# Patient Record
Sex: FEMALE | Race: BLACK OR AFRICAN AMERICAN | NOT HISPANIC OR LATINO | Employment: FULL TIME | URBAN - METROPOLITAN AREA
[De-identification: names, ages, dates, MRNs, and addresses within clinical notes are randomized per-mention and may not be internally consistent; named-entity substitution may affect disease eponyms.]

---

## 2018-08-07 ENCOUNTER — HISTORICAL (OUTPATIENT)
Dept: RADIOLOGY | Facility: HOSPITAL | Age: 53
End: 2018-08-07

## 2018-08-15 ENCOUNTER — HISTORICAL (OUTPATIENT)
Dept: RADIOLOGY | Facility: HOSPITAL | Age: 53
End: 2018-08-15

## 2018-08-21 ENCOUNTER — HISTORICAL (OUTPATIENT)
Dept: RADIOLOGY | Facility: HOSPITAL | Age: 53
End: 2018-08-21

## 2018-09-04 ENCOUNTER — HISTORICAL (OUTPATIENT)
Dept: RADIOLOGY | Facility: HOSPITAL | Age: 53
End: 2018-09-04

## 2018-09-06 ENCOUNTER — HISTORICAL (OUTPATIENT)
Dept: SURGERY | Facility: HOSPITAL | Age: 53
End: 2018-09-06

## 2018-09-06 LAB
ABS NEUT (OLG): 3.2 X10(3)/MCL (ref 1.5–6.9)
ALBUMIN SERPL-MCNC: 3.6 GM/DL (ref 3.4–5)
ALBUMIN/GLOB SERPL: 0.8 RATIO
ALP SERPL-CCNC: 92 UNIT/L (ref 30–113)
ALT SERPL-CCNC: 29 UNIT/L (ref 10–45)
APTT PPP: 28 SECOND(S) (ref 25–35)
AST SERPL-CCNC: 15 UNIT/L (ref 15–37)
BASOPHILS # BLD AUTO: 0 X10(3)/MCL (ref 0–0.1)
BASOPHILS NFR BLD AUTO: 0 % (ref 0–1)
BILIRUB SERPL-MCNC: 0.3 MG/DL (ref 0.1–0.9)
BILIRUBIN DIRECT+TOT PNL SERPL-MCNC: 0.1 MG/DL (ref 0–0.3)
BILIRUBIN DIRECT+TOT PNL SERPL-MCNC: 0.2 MG/DL
BUN SERPL-MCNC: 21 MG/DL (ref 10–20)
CALCIUM SERPL-MCNC: 9 MG/DL (ref 8–10.5)
CHLORIDE SERPL-SCNC: 106 MMOL/L (ref 100–108)
CO2 SERPL-SCNC: 26 MMOL/L (ref 21–35)
CREAT SERPL-MCNC: 0.8 MG/DL (ref 0.7–1.3)
EOSINOPHIL # BLD AUTO: 0.2 X10(3)/MCL (ref 0–0.6)
EOSINOPHIL NFR BLD AUTO: 4 % (ref 0–5)
ERYTHROCYTE [DISTWIDTH] IN BLOOD BY AUTOMATED COUNT: 13.2 % (ref 11.5–17)
GLOBULIN SER-MCNC: 4.6 GM/DL
GLUCOSE SERPL-MCNC: 90 MG/DL (ref 75–116)
HCT VFR BLD AUTO: 39.1 % (ref 36–48)
HGB BLD-MCNC: 12.7 GM/DL (ref 12–16)
INR PPP: 0.9 (ref 0–1.2)
LYMPHOCYTES # BLD AUTO: 1.7 X10(3)/MCL (ref 0.5–4.1)
LYMPHOCYTES NFR BLD AUTO: 28.8 % (ref 15–40)
MCH RBC QN AUTO: 25 PG (ref 27–34)
MCHC RBC AUTO-ENTMCNC: 32 GM/DL (ref 31–36)
MCV RBC AUTO: 78 FL (ref 80–99)
MONOCYTES # BLD AUTO: 0.6 X10(3)/MCL (ref 0–1.1)
MONOCYTES NFR BLD AUTO: 11 % (ref 4–12)
NEUTROPHILS # BLD AUTO: 3.2 X10(3)/MCL (ref 1.5–6.9)
NEUTROPHILS NFR BLD AUTO: 56 % (ref 43–75)
PLATELET # BLD AUTO: 337 X10(3)/MCL (ref 140–400)
PMV BLD AUTO: 8.7 FL (ref 6.8–10)
POTASSIUM SERPL-SCNC: 4 MMOL/L (ref 3.6–5.2)
PROT SERPL-MCNC: 8.2 GM/DL (ref 6.4–8.2)
PROTHROMBIN TIME: 9.8 SECOND(S) (ref 9–12)
RBC # BLD AUTO: 5.02 X10(6)/MCL (ref 4.2–5.4)
SODIUM SERPL-SCNC: 140 MMOL/L (ref 135–145)
WBC # SPEC AUTO: 5.8 X10(3)/MCL (ref 4.5–11.5)

## 2018-09-10 ENCOUNTER — HOSPITAL ENCOUNTER (OUTPATIENT)
Dept: MEDSURG UNIT | Facility: HOSPITAL | Age: 53
End: 2018-09-11
Attending: SURGERY | Admitting: SURGERY

## 2020-05-14 ENCOUNTER — HISTORICAL (OUTPATIENT)
Dept: ADMINISTRATIVE | Facility: HOSPITAL | Age: 55
End: 2020-05-14

## 2020-05-15 ENCOUNTER — HISTORICAL (OUTPATIENT)
Dept: ADMINISTRATIVE | Facility: HOSPITAL | Age: 55
End: 2020-05-15

## 2020-08-10 ENCOUNTER — HISTORICAL (OUTPATIENT)
Dept: RADIOLOGY | Facility: HOSPITAL | Age: 55
End: 2020-08-10

## 2020-11-05 ENCOUNTER — HISTORICAL (OUTPATIENT)
Dept: ADMINISTRATIVE | Facility: HOSPITAL | Age: 55
End: 2020-11-05

## 2021-10-15 ENCOUNTER — HISTORICAL (OUTPATIENT)
Dept: SURGERY | Facility: HOSPITAL | Age: 56
End: 2021-10-15

## 2021-10-15 LAB — SARS-COV-2 AG RESP QL IA.RAPID: NOT DETECTED

## 2021-10-18 ENCOUNTER — HISTORICAL (OUTPATIENT)
Dept: ANESTHESIOLOGY | Facility: HOSPITAL | Age: 56
End: 2021-10-18

## 2021-11-23 ENCOUNTER — HISTORICAL (OUTPATIENT)
Dept: ADMINISTRATIVE | Facility: HOSPITAL | Age: 56
End: 2021-11-23

## 2021-12-03 ENCOUNTER — HISTORICAL (OUTPATIENT)
Dept: ADMINISTRATIVE | Facility: HOSPITAL | Age: 56
End: 2021-12-03

## 2022-04-30 NOTE — OP NOTE
ADMITTING DIAGNOSIS:  Ventral incisional supraumbilical hernia, incarcerated, with no obstruction, initial onset.    PROCEDURES:    1. Robotic assisted laparoscopic repair of ventral hernia with primary closure using 0 Prolene, and then coverage with 9 cm Symbotex mesh.    2. Repair of rectus diastasis with 0 Prolene.    INDICATIONS:  The patient is a 53-year-old  female with anxiety, overactive bladder, had a  x3, tubal ligation, and colonoscopy 2 years ago by Dr. Sutton, had some polyps at that time.  She works at the [employer] office and came to me with a supraumbilical hernia, and associated pain and tenderness present over the last 3 months.  She was referred to me by Dr. Carrera.  She had an ultrasound of the abdomen on 2018, that was normal with fatty liver.  Pelvis also was normal.  CT scan of the abdomen and pelvis showed a supraumbilical ventral abdominal wall hernia with preperitoneal fat, not incarcerated, not reducible.    DESCRIPTION OF PROCEDURE:  The patient was brought to the operating room, underwent Visiport insertion in the left upper quadrant subcostally, and then had a second 8 mm trocar placed just adjacent to the anterior-superior iliac spine.  In between these 2 trocars, a third, 8 mm trocar was placed 5 cm posteriorly between the subcostal margin and the anterior-superior iliac spine.  The patient underwent lysis of adhesions and reduction of the hernia defect.  The preperitoneal fat was reduced, and the hernia defect measured approximately 1 to 2 cm in size.  There was a 2nd hernia defect just immediately above the umbilicus that was 1 to 2 cm in size as well, and reduced, that was an incisional hernia.  The patient had a rectus diastasis.  I felt the rectus diastasis needed to be reapproximated and incorporated into the hernia repair.  A 0 Prolene was used to repair as much of the rectus diastasis as I could.  Given the limitations of the robot  that I was working with, I could not rotate all the way to the top of the xiphoid, but was able to get a substantial amount of her rectus diastasis closed.  The patient had 0 Prolene used to do this, and then I took a piece of 9 cm Symbotex mesh to cover the area where the hernias were.  This mesh was sutured in with 0 Prolene as well circumferentially.  The patient also had it tacked to the primary closure in the midline with a 0 Prolene.  The patient tolerated the procedure well, had no difficulties, had the trocars removed.  The aponeuroses of the 8 and 10 mm trocar sites were closed with 0 Vicryl on a  needle.  Subcu was closed with 4-0 plain gut suture.  Dermabond was used for the skin.  Sterile dressings were applied.  No problems were encountered.  The patient tolerated the procedure well.  It should be noted, that the patient will undergo monitoring overnight and pain management before discharge in the morning.     I appreciate the consultation referral from Dr. Carrera, as well as  __________.        WALE/MILAGROS   DD: 09/10/2018 1823   DT: 09/10/2018 1918  Job # [id number]    cc: ____________ ____________   Dr. Carrera

## 2022-04-30 NOTE — DISCHARGE SUMMARY
ADMITTING DIAGNOSIS:  Ventral incisional hernia, reducible, not incarcerated, consistent with supraumbilical hernia and preperitoneal fat incarcerated.    PROCEDURE:    1. Robotic assisted laparoscopic ventral hernia repair with primary closure using 0 Prolene and then coverage with a 9 cm circular Symbotex mesh.    2. Repair of rectus diastasis with 0 Prolene.   The patient is a 53-year-old  female with a history of anxiety overactive bladder, and is status post  and tubal ligation in the past.  She has pain and tenderness in the midepigastric supraumbilical region consistent with an incarcerated supraumbilical ventral abdominal wall hernia noted on CT scan on 2018.  The patient has preperitoneal fat stuck in the hernia defect. It is not incarcerated. The fat itself is not reducible.  Patient was scheduled for a hernia repair.  Underwent robotic assisted laparoscopic hernia repair, had a rectus diastasis that required suturing with 0 Prolene.  Once this was accomplished, the hernia defect was closed in the repair of the rectus diastasis.  The patient then had coverage with a piece of Symbotex mesh over the area of the hernia defect.  Prolene was used to suture the mesh in place.  The nonadhesive side this mesh was placed toward the bowel.  The patient postoperatively was monitored for any pain.  She did well from this. Had no problems or difficulties and will be discharged home with followup in the office as an outpatient.  I appreciate the consultation.  She will see me in the office on Friday at 6:00 a.m. in the [facility].  I appreciate the consultation referral from Dr. Carrera as well as Dr. Silverman.        BBS/MODL   DD: 2018 0454   DT: 2018 0550  Job # [id number]    cc: MARIO Silverman M.D.

## 2023-03-21 ENCOUNTER — TELEPHONE (OUTPATIENT)
Dept: FAMILY MEDICINE | Facility: CLINIC | Age: 58
End: 2023-03-21

## 2023-03-21 ENCOUNTER — OFFICE VISIT (OUTPATIENT)
Dept: FAMILY MEDICINE | Facility: CLINIC | Age: 58
End: 2023-03-21
Payer: COMMERCIAL

## 2023-03-21 VITALS
OXYGEN SATURATION: 97 % | TEMPERATURE: 98 F | SYSTOLIC BLOOD PRESSURE: 138 MMHG | BODY MASS INDEX: 32.59 KG/M2 | HEART RATE: 113 BPM | WEIGHT: 207.63 LBS | HEIGHT: 67 IN | DIASTOLIC BLOOD PRESSURE: 84 MMHG

## 2023-03-21 DIAGNOSIS — R50.9 FEVER, UNSPECIFIED FEVER CAUSE: Primary | ICD-10-CM

## 2023-03-21 DIAGNOSIS — K52.9 AGE (ACUTE GASTROENTERITIS): ICD-10-CM

## 2023-03-21 LAB
CTP QC/QA: YES
CTP QC/QA: YES
FLUAV AG NPH QL: NEGATIVE
FLUBV AG NPH QL: NEGATIVE
SARS-COV-2 AG RESP QL IA.RAPID: NEGATIVE

## 2023-03-21 PROCEDURE — 3075F PR MOST RECENT SYSTOLIC BLOOD PRESS GE 130-139MM HG: ICD-10-PCS | Mod: CPTII,,, | Performed by: FAMILY MEDICINE

## 2023-03-21 PROCEDURE — 1159F MED LIST DOCD IN RCRD: CPT | Mod: CPTII,,, | Performed by: FAMILY MEDICINE

## 2023-03-21 PROCEDURE — 3079F DIAST BP 80-89 MM HG: CPT | Mod: CPTII,,, | Performed by: FAMILY MEDICINE

## 2023-03-21 PROCEDURE — 87811 SARS CORONAVIRUS 2 ANTIGEN POCT, MANUAL READ: ICD-10-PCS | Mod: QW,,, | Performed by: FAMILY MEDICINE

## 2023-03-21 PROCEDURE — 3075F SYST BP GE 130 - 139MM HG: CPT | Mod: CPTII,,, | Performed by: FAMILY MEDICINE

## 2023-03-21 PROCEDURE — 99213 OFFICE O/P EST LOW 20 MIN: CPT | Mod: ,,, | Performed by: FAMILY MEDICINE

## 2023-03-21 PROCEDURE — 87400 INFLUENZA A/B EACH AG IA: CPT | Mod: QW,,, | Performed by: FAMILY MEDICINE

## 2023-03-21 PROCEDURE — 1159F PR MEDICATION LIST DOCUMENTED IN MEDICAL RECORD: ICD-10-PCS | Mod: CPTII,,, | Performed by: FAMILY MEDICINE

## 2023-03-21 PROCEDURE — 1160F RVW MEDS BY RX/DR IN RCRD: CPT | Mod: CPTII,,, | Performed by: FAMILY MEDICINE

## 2023-03-21 PROCEDURE — 87400 POCT INFLUENZA A/B: ICD-10-PCS | Mod: QW,,, | Performed by: FAMILY MEDICINE

## 2023-03-21 PROCEDURE — 3008F BODY MASS INDEX DOCD: CPT | Mod: CPTII,,, | Performed by: FAMILY MEDICINE

## 2023-03-21 PROCEDURE — 99213 PR OFFICE/OUTPT VISIT, EST, LEVL III, 20-29 MIN: ICD-10-PCS | Mod: ,,, | Performed by: FAMILY MEDICINE

## 2023-03-21 PROCEDURE — 87811 SARS-COV-2 COVID19 W/OPTIC: CPT | Mod: QW,,, | Performed by: FAMILY MEDICINE

## 2023-03-21 PROCEDURE — 3008F PR BODY MASS INDEX (BMI) DOCUMENTED: ICD-10-PCS | Mod: CPTII,,, | Performed by: FAMILY MEDICINE

## 2023-03-21 PROCEDURE — 3079F PR MOST RECENT DIASTOLIC BLOOD PRESSURE 80-89 MM HG: ICD-10-PCS | Mod: CPTII,,, | Performed by: FAMILY MEDICINE

## 2023-03-21 PROCEDURE — 1160F PR REVIEW ALL MEDS BY PRESCRIBER/CLIN PHARMACIST DOCUMENTED: ICD-10-PCS | Mod: CPTII,,, | Performed by: FAMILY MEDICINE

## 2023-03-21 RX ORDER — KRILL/OM-3/DHA/EPA/PHOSPHO/AST 1000-230MG
900 CAPSULE ORAL DAILY
COMMUNITY
End: 2023-03-21

## 2023-03-21 NOTE — PROGRESS NOTES
"SUBJECTIVE:  HPI    Herminia Negron is a 57 y.o. female here for Diarrhea, Generalized Body Aches, and Sore Throat.  24 hour history of at least 5 episodes of watery diarrhea without mucus or blood.  She had an associated sore throat.  She had subjective fever and chills but no objective fever.  She denies any headache, runny nose, cough, vomiting.  She has not had any further diarrhea since the early morning hours today.  She does feel fatigued.    [facility] allergies, medications, history, and problem list were updated as appropriate.    ROS:  Pertinent ROS as above, otherwise negative    OBJECTIVE:  Vital signs  Visit Vitals  /84 (BP Location: Left arm, Patient Position: Sitting, BP Method: Medium (Manual))   Pulse (!) 113   Temp 97.6 °F (36.4 °C) (Temporal)   Ht 5' 7.32" (1.71 m)   Wt 94.2 kg (207 lb 9.6 oz)   LMP  (LMP Unknown)   SpO2 97%   BMI 32.20 kg/m²          PHYSICAL EXAM:  General:  Awake, alert, no acute distress   Eyes:  Pupils equal, round, reactive to light.  Conjunctiva normal bilaterally.  Ears:  Bilateral external auditory canals normal.  Bilateral tympanic membranes normal.  Nares:  Clear bilaterally  Oropharynx:  Clear with no erythema or exudate  Neck:  No lymphadenopathy  Cardiovascular: Regular rate and rhythm.  No murmurs.  Respiratory: Clear to auscultation bilaterally, normal effort  Skin: No rashes    Recent Results (from the past 24 hour(s))   SARS Coronavirus 2 Antigen, POCT Manual Read    Collection Time: 03/21/23  5:24 PM   Result Value Ref Range    SARS Coronavirus 2 Antigen Negative Negative     Acceptable Yes    POCT Influenza A/B    Collection Time: 03/21/23  5:25 PM   Result Value Ref Range    Rapid Influenza A Ag Negative Negative    Rapid Influenza B Ag Negative Negative     Acceptable Yes        ASSESSMENT/PLAN:  1. Fever, unspecified fever cause  -     POCT Influenza A/B; Future; Expected date: 03/21/2023  -     SARS Coronavirus 2 Antigen, " POCT Manual Read; Future; Expected date: 03/21/2023    2. AGE (acute gastroenteritis)  Assessment & Plan:  Fluids, Motrin, Tylenol, Imodium as needed    Expect resolution in the next 72 hours              Follow Up:  Follow up if symptoms worsen or fail to improve.

## 2023-03-21 NOTE — TELEPHONE ENCOUNTER
Pt states that she had a virus yesterday, but today she is still having body aches, nausea. She would like to be seen today. Please advise.           [phone]

## 2023-06-27 ENCOUNTER — OFFICE VISIT (OUTPATIENT)
Dept: FAMILY MEDICINE | Facility: CLINIC | Age: 58
End: 2023-06-27
Payer: COMMERCIAL

## 2023-06-27 VITALS
OXYGEN SATURATION: 98 % | WEIGHT: 217 LBS | TEMPERATURE: 97 F | HEIGHT: 67 IN | SYSTOLIC BLOOD PRESSURE: 126 MMHG | DIASTOLIC BLOOD PRESSURE: 82 MMHG | HEART RATE: 89 BPM | BODY MASS INDEX: 34.06 KG/M2

## 2023-06-27 DIAGNOSIS — J20.9 ACUTE BRONCHITIS, UNSPECIFIED ORGANISM: Primary | ICD-10-CM

## 2023-06-27 PROBLEM — E66.9 OBESITY: Status: ACTIVE | Noted: 2023-06-27

## 2023-06-27 PROBLEM — F41.9 ANXIETY: Status: RESOLVED | Noted: 2023-06-27 | Resolved: 2023-06-27

## 2023-06-27 PROBLEM — M62.89 FASCIAL HERNIA: Status: ACTIVE | Noted: 2023-06-27

## 2023-06-27 PROBLEM — M62.89 FASCIAL HERNIA: Status: RESOLVED | Noted: 2023-06-27 | Resolved: 2023-06-27

## 2023-06-27 PROBLEM — I47.10 SVT (SUPRAVENTRICULAR TACHYCARDIA): Status: RESOLVED | Noted: 2020-11-02 | Resolved: 2023-06-27

## 2023-06-27 PROBLEM — F41.9 ANXIETY: Status: ACTIVE | Noted: 2023-06-27

## 2023-06-27 PROBLEM — K52.9 AGE (ACUTE GASTROENTERITIS): Status: RESOLVED | Noted: 2023-03-21 | Resolved: 2023-06-27

## 2023-06-27 PROBLEM — F32.9 MAJOR DEPRESSIVE DISORDER: Status: RESOLVED | Noted: 2023-06-27 | Resolved: 2023-06-27

## 2023-06-27 PROBLEM — F32.9 MAJOR DEPRESSIVE DISORDER: Status: ACTIVE | Noted: 2023-06-27

## 2023-06-27 PROBLEM — I47.10 SVT (SUPRAVENTRICULAR TACHYCARDIA): Status: ACTIVE | Noted: 2020-11-02

## 2023-06-27 PROCEDURE — 3074F SYST BP LT 130 MM HG: CPT | Mod: CPTII,,, | Performed by: FAMILY MEDICINE

## 2023-06-27 PROCEDURE — 99214 PR OFFICE/OUTPT VISIT, EST, LEVL IV, 30-39 MIN: ICD-10-PCS | Mod: ,,, | Performed by: FAMILY MEDICINE

## 2023-06-27 PROCEDURE — 3008F PR BODY MASS INDEX (BMI) DOCUMENTED: ICD-10-PCS | Mod: CPTII,,, | Performed by: FAMILY MEDICINE

## 2023-06-27 PROCEDURE — 1160F PR REVIEW ALL MEDS BY PRESCRIBER/CLIN PHARMACIST DOCUMENTED: ICD-10-PCS | Mod: CPTII,,, | Performed by: FAMILY MEDICINE

## 2023-06-27 PROCEDURE — 3079F DIAST BP 80-89 MM HG: CPT | Mod: CPTII,,, | Performed by: FAMILY MEDICINE

## 2023-06-27 PROCEDURE — 3079F PR MOST RECENT DIASTOLIC BLOOD PRESSURE 80-89 MM HG: ICD-10-PCS | Mod: CPTII,,, | Performed by: FAMILY MEDICINE

## 2023-06-27 PROCEDURE — 3074F PR MOST RECENT SYSTOLIC BLOOD PRESSURE < 130 MM HG: ICD-10-PCS | Mod: CPTII,,, | Performed by: FAMILY MEDICINE

## 2023-06-27 PROCEDURE — 1159F PR MEDICATION LIST DOCUMENTED IN MEDICAL RECORD: ICD-10-PCS | Mod: CPTII,,, | Performed by: FAMILY MEDICINE

## 2023-06-27 PROCEDURE — 1159F MED LIST DOCD IN RCRD: CPT | Mod: CPTII,,, | Performed by: FAMILY MEDICINE

## 2023-06-27 PROCEDURE — 3008F BODY MASS INDEX DOCD: CPT | Mod: CPTII,,, | Performed by: FAMILY MEDICINE

## 2023-06-27 PROCEDURE — 99214 OFFICE O/P EST MOD 30 MIN: CPT | Mod: ,,, | Performed by: FAMILY MEDICINE

## 2023-06-27 PROCEDURE — 1160F RVW MEDS BY RX/DR IN RCRD: CPT | Mod: CPTII,,, | Performed by: FAMILY MEDICINE

## 2023-06-27 RX ORDER — BENZONATATE 100 MG/1
100 CAPSULE ORAL 3 TIMES DAILY PRN
Qty: 30 CAPSULE | Refills: 0 | Status: SHIPPED | OUTPATIENT
Start: 2023-06-27 | End: 2023-07-07

## 2023-06-27 RX ORDER — ALBUTEROL SULFATE 90 UG/1
2 AEROSOL, METERED RESPIRATORY (INHALATION) EVERY 4 HOURS PRN
Qty: 18 G | Refills: 0 | Status: SHIPPED | OUTPATIENT
Start: 2023-06-27 | End: 2023-09-19

## 2023-06-27 RX ORDER — PREDNISONE 20 MG/1
60 TABLET ORAL DAILY
Qty: 21 TABLET | Refills: 0 | Status: SHIPPED | OUTPATIENT
Start: 2023-06-27 | End: 2023-07-04

## 2023-06-27 NOTE — PROGRESS NOTES
"SUBJECTIVE:  HPI    Herminia Negron is a 57 y.o. female here for Cough (congestion).  Day history of cough that is intermittently productive of sputum.  Her cough is worse in the morning and at night.  She has not had any fever.  She has had minimal nasal congestion.  She denies any dyspnea or chest tightness.      [facility]s allergies, medications, history, and problem list were updated as appropriate.    ROS:  Pertinent ROS as above, otherwise negative    OBJECTIVE:  Vital signs  Visit Vitals  /82 (BP Location: Right arm, Patient Position: Sitting)   Pulse 89   Temp 97.4 °F (36.3 °C) (Temporal)   Ht 5' 7.32" (1.71 m)   Wt 98.4 kg (217 lb)   LMP  (LMP Unknown)   SpO2 98%   BMI 33.66 kg/m²          PHYSICAL EXAM:  General:  Awake, alert, no acute distress   Eyes:  Pupils equal, round, reactive to light.  Conjunctiva normal bilaterally.  Ears:  Bilateral external auditory canals normal.  Bilateral tympanic membranes normal.  Nares:  Bilateral turbinate erythema and edema with clear rhinorrhea.  Oropharynx:  No erythema or exudate.  Neck:  No lymphadenopathy  Cardiovascular: Regular rate and rhythm.  No murmurs.  Respiratory: Clear to auscultation bilaterally, normal effort  Skin: No rashes    ASSESSMENT/PLAN:  1. Acute bronchitis, unspecified organism  -     predniSONE (DELTASONE) 20 MG tablet; Take 3 tablets (60 mg total) by mouth once daily. for 7 days  Dispense: 21 tablet; Refill: 0  -     albuterol (VENTOLIN HFA) 90 mcg/actuation inhaler; Inhale 2 puffs into the lungs every 4 (four) hours as needed for Wheezing (cough/wheezing/shortness of breath). Rescue  Dispense: 18 g; Refill: 0  -     benzonatate (TESSALON) 100 MG capsule; Take 1 capsule (100 mg total) by mouth 3 (three) times daily as needed for Cough.  Dispense: 30 capsule; Refill: 0      If symptoms fail to improve after 7-10 more days, can send out antibiotics for secondary bacterial bronchitis      "

## 2023-07-03 ENCOUNTER — TELEPHONE (OUTPATIENT)
Dept: FAMILY MEDICINE | Facility: CLINIC | Age: 58
End: 2023-07-03
Payer: COMMERCIAL

## 2023-07-03 DIAGNOSIS — J20.9 ACUTE BRONCHITIS, UNSPECIFIED ORGANISM: Primary | ICD-10-CM

## 2023-07-03 RX ORDER — AMOXICILLIN AND CLAVULANATE POTASSIUM 875; 125 MG/1; MG/1
1 TABLET, FILM COATED ORAL 2 TIMES DAILY
COMMUNITY
End: 2023-07-03 | Stop reason: SDUPTHER

## 2023-07-03 RX ORDER — AMOXICILLIN AND CLAVULANATE POTASSIUM 875; 125 MG/1; MG/1
1 TABLET, FILM COATED ORAL 2 TIMES DAILY
Qty: 14 TABLET | Refills: 0 | Status: SHIPPED | OUTPATIENT
Start: 2023-07-03 | End: 2023-09-19

## 2023-07-03 NOTE — TELEPHONE ENCOUNTER
Pt called back.. she states her cough is worse and it 's starting to make her back hurt from coughing so much.. per Dr. Silverman's note, if she did not get better, he would call out antibiotics for her... Discussed with Dr. Aviles and will send out Augmentin 875 BID for 7 days.

## 2023-08-16 ENCOUNTER — TELEPHONE (OUTPATIENT)
Dept: FAMILY MEDICINE | Facility: CLINIC | Age: 58
End: 2023-08-16
Payer: COMMERCIAL

## 2023-09-19 ENCOUNTER — OFFICE VISIT (OUTPATIENT)
Dept: FAMILY MEDICINE | Facility: CLINIC | Age: 58
End: 2023-09-19
Payer: COMMERCIAL

## 2023-09-19 VITALS
DIASTOLIC BLOOD PRESSURE: 76 MMHG | SYSTOLIC BLOOD PRESSURE: 136 MMHG | OXYGEN SATURATION: 96 % | HEIGHT: 67 IN | TEMPERATURE: 97 F | BODY MASS INDEX: 33.8 KG/M2 | HEART RATE: 108 BPM | WEIGHT: 215.38 LBS

## 2023-09-19 DIAGNOSIS — F51.04 PSYCHOPHYSIOLOGICAL INSOMNIA: ICD-10-CM

## 2023-09-19 DIAGNOSIS — F41.0 ANXIETY ATTACK: Primary | ICD-10-CM

## 2023-09-19 PROCEDURE — 99215 OFFICE O/P EST HI 40 MIN: CPT | Mod: ,,, | Performed by: FAMILY MEDICINE

## 2023-09-19 PROCEDURE — 3008F PR BODY MASS INDEX (BMI) DOCUMENTED: ICD-10-PCS | Mod: CPTII,,, | Performed by: FAMILY MEDICINE

## 2023-09-19 PROCEDURE — 3075F SYST BP GE 130 - 139MM HG: CPT | Mod: CPTII,,, | Performed by: FAMILY MEDICINE

## 2023-09-19 PROCEDURE — 3078F DIAST BP <80 MM HG: CPT | Mod: CPTII,,, | Performed by: FAMILY MEDICINE

## 2023-09-19 PROCEDURE — 1159F PR MEDICATION LIST DOCUMENTED IN MEDICAL RECORD: ICD-10-PCS | Mod: CPTII,,, | Performed by: FAMILY MEDICINE

## 2023-09-19 PROCEDURE — 3078F PR MOST RECENT DIASTOLIC BLOOD PRESSURE < 80 MM HG: ICD-10-PCS | Mod: CPTII,,, | Performed by: FAMILY MEDICINE

## 2023-09-19 PROCEDURE — 1160F PR REVIEW ALL MEDS BY PRESCRIBER/CLIN PHARMACIST DOCUMENTED: ICD-10-PCS | Mod: CPTII,,, | Performed by: FAMILY MEDICINE

## 2023-09-19 PROCEDURE — 1160F RVW MEDS BY RX/DR IN RCRD: CPT | Mod: CPTII,,, | Performed by: FAMILY MEDICINE

## 2023-09-19 PROCEDURE — 3075F PR MOST RECENT SYSTOLIC BLOOD PRESS GE 130-139MM HG: ICD-10-PCS | Mod: CPTII,,, | Performed by: FAMILY MEDICINE

## 2023-09-19 PROCEDURE — 99215 PR OFFICE/OUTPT VISIT, EST, LEVL V, 40-54 MIN: ICD-10-PCS | Mod: ,,, | Performed by: FAMILY MEDICINE

## 2023-09-19 PROCEDURE — 3008F BODY MASS INDEX DOCD: CPT | Mod: CPTII,,, | Performed by: FAMILY MEDICINE

## 2023-09-19 PROCEDURE — 1159F MED LIST DOCD IN RCRD: CPT | Mod: CPTII,,, | Performed by: FAMILY MEDICINE

## 2023-09-19 RX ORDER — GARLIC 1000 MG
1 CAPSULE ORAL DAILY
COMMUNITY
End: 2024-01-10

## 2023-09-19 RX ORDER — ALPRAZOLAM 0.25 MG/1
0.25 TABLET ORAL NIGHTLY PRN
Qty: 30 TABLET | Refills: 0 | Status: SHIPPED | OUTPATIENT
Start: 2023-09-19 | End: 2024-01-10

## 2023-09-19 NOTE — PROGRESS NOTES
"SUBJECTIVE:  HPI    Herminia Negron is a 58 y.o. female here for Neck Pain.     Actually, today she complains of severe stressors related to work.  She is having some difficulties at work which are caring over into her personal life.  She is having difficulty falling asleep and staying asleep because of the stressors.  She is constantly worried and anxious.      [facility]s allergies, medications, history, and problem list were updated as appropriate.    ROS:  Pertinent ROS as above, otherwise negative    OBJECTIVE:  Vital signs  Visit Vitals  /76 (BP Location: Left arm, Patient Position: Sitting, BP Method: Medium (Manual))   Pulse 108   Temp 97.1 °F (36.2 °C) (Temporal)   Ht 5' 7.32" (1.71 m)   Wt 97.7 kg (215 lb 6.4 oz)   LMP  (LMP Unknown)   SpO2 96%   BMI 33.41 kg/m²          PHYSICAL EXAM:  General:  Awake, alert, no acute distress  Psychiatric: Very good judgment and insight.  Slightly anxious appearing.  No suicidal or homicidal ideation.      ASSESSMENT/PLAN:  1. Anxiety attack    2. Psychophysiological insomnia  -     ALPRAZolam (XANAX) 0.25 MG tablet; Take 1 tablet (0.25 mg total) by mouth nightly as needed for Anxiety.  Dispense: 30 tablet; Refill: 0      Out of work for 2 weeks to avoid stressors     Alprazolam 0.25 mg at night to help with sleep      Follow Up:  Follow up if symptoms worsen or fail to improve.      Total time spent with patient today was 46 minutes    "

## 2024-01-09 ENCOUNTER — PATIENT OUTREACH (OUTPATIENT)
Dept: ADMINISTRATIVE | Facility: HOSPITAL | Age: 59
End: 2024-01-09
Payer: COMMERCIAL

## 2024-01-09 ENCOUNTER — TELEPHONE (OUTPATIENT)
Dept: GASTROENTEROLOGY | Facility: CLINIC | Age: 59
End: 2024-01-09
Payer: COMMERCIAL

## 2024-01-09 DIAGNOSIS — Z86.010 HISTORY OF COLON POLYPS: Primary | ICD-10-CM

## 2024-01-09 DIAGNOSIS — Z12.11 COLON CANCER SCREENING: Primary | ICD-10-CM

## 2024-01-09 DIAGNOSIS — Z12.11 SCREENING FOR COLON CANCER: ICD-10-CM

## 2024-01-09 NOTE — TELEPHONE ENCOUNTER
Evaluate patient for directly scheduled screening colonoscopy. MAHI listed on cardiology note under referral. Achee's referral states no prior colonoscopy, cardiology's note lists prior colonoscopy. Patient will need to clarify if she has had a colonoscopy in the past 10 years.  NBP

## 2024-01-09 NOTE — PROGRESS NOTES
Population Health Chart Review & Patient Outreach Details      Further Action Needed If Patient Returns Outreach:      Spoke with Mrs. Hope regarding colon cancer screening. She stated that she would rather go to [place] for colonoscopy, no preference on doctor. Referral placed to Dr. Thelma Vergara.      Health Maintenance Topics Addressed and Outreach Outcomes / Actions Taken:              Colorectal Cancer Screening [x]  Colonoscopy Case Request / Referral / Home Test Order Placed    []  External Records Requested & Care Team Updated if Applicable    []  External Records Uploaded, Care Team Updated, & History Updated if Applicable    []  Patient Declined Completing Colon Cancer Screening    []  Patient Will Schedule with External Provider & Care Team Updated if Applicable    []  Fit Kit Mailed (add the SmartPhrase under additional notes)    []  Reminded Patient to Complete Home Test            Ashley Pearl MA - Panel Care Coordinator

## 2024-01-10 VITALS — BODY MASS INDEX: 32.96 KG/M2 | WEIGHT: 210 LBS | HEIGHT: 67 IN

## 2024-01-10 NOTE — TELEPHONE ENCOUNTER
Called patient to schedule colonoscopy. Lvm telling her to call the office back. Will try calling patient again today if she does not call back first. - dmp

## 2024-01-12 RX ORDER — SOD SULF/POT CHLORIDE/MAG SULF 1.479 G
12 TABLET ORAL DAILY
Qty: 24 TABLET | Refills: 0 | Status: SHIPPED | OUTPATIENT
Start: 2024-01-12 | End: 2024-01-25

## 2024-01-17 ENCOUNTER — TELEPHONE (OUTPATIENT)
Dept: GASTROENTEROLOGY | Facility: CLINIC | Age: 59
End: 2024-01-17
Payer: COMMERCIAL

## 2024-01-17 NOTE — TELEPHONE ENCOUNTER
----- Message from Apoorva Shipley sent at 1/16/2024  2:50 PM CST -----  Regarding: Prep Medication  Contact: patient  Per phone call with patient, she has some concerns regarding her prep medication.  Please return call at [phone].    Thanks,  SJ

## 2024-01-17 NOTE — TELEPHONE ENCOUNTER
Returned pt call. Pt stated the Sutab was $200, I told her she needed to coupon and she will not pay anymore than $50. Pt stated she does not want to pay $50 either. I told her that I can send a message to have a liquid prep called out; however, I am not sure how much it will cost with her insurance. Pt stated that would be fine. sudarshan

## 2024-01-19 RX ORDER — POLYETHYLENE GLYCOL 3350, SODIUM SULFATE ANHYDROUS, SODIUM BICARBONATE, SODIUM CHLORIDE, POTASSIUM CHLORIDE 236; 22.74; 6.74; 5.86; 2.97 G/4L; G/4L; G/4L; G/4L; G/4L
4 POWDER, FOR SOLUTION ORAL ONCE
Qty: 4000 ML | Refills: 0 | Status: SHIPPED | OUTPATIENT
Start: 2024-01-19 | End: 2024-01-19

## 2024-01-25 ENCOUNTER — OFFICE VISIT (OUTPATIENT)
Dept: FAMILY MEDICINE | Facility: CLINIC | Age: 59
End: 2024-01-25
Payer: COMMERCIAL

## 2024-01-25 VITALS
OXYGEN SATURATION: 98 % | TEMPERATURE: 97 F | DIASTOLIC BLOOD PRESSURE: 78 MMHG | WEIGHT: 222.19 LBS | HEART RATE: 105 BPM | BODY MASS INDEX: 34.87 KG/M2 | HEIGHT: 67 IN | SYSTOLIC BLOOD PRESSURE: 140 MMHG

## 2024-01-25 DIAGNOSIS — F41.0 ANXIETY ATTACK: ICD-10-CM

## 2024-01-25 DIAGNOSIS — Z00.01 ENCOUNTER FOR WELL ADULT EXAM WITH ABNORMAL FINDINGS: Primary | ICD-10-CM

## 2024-01-25 DIAGNOSIS — E66.9 CLASS 1 OBESITY WITH SERIOUS COMORBIDITY AND BODY MASS INDEX (BMI) OF 34.0 TO 34.9 IN ADULT, UNSPECIFIED OBESITY TYPE: ICD-10-CM

## 2024-01-25 PROCEDURE — 99396 PREV VISIT EST AGE 40-64: CPT | Mod: ,,, | Performed by: FAMILY MEDICINE

## 2024-01-25 PROCEDURE — 3077F SYST BP >= 140 MM HG: CPT | Mod: CPTII,,, | Performed by: FAMILY MEDICINE

## 2024-01-25 PROCEDURE — 1159F MED LIST DOCD IN RCRD: CPT | Mod: CPTII,,, | Performed by: FAMILY MEDICINE

## 2024-01-25 PROCEDURE — 1160F RVW MEDS BY RX/DR IN RCRD: CPT | Mod: CPTII,,, | Performed by: FAMILY MEDICINE

## 2024-01-25 PROCEDURE — 3008F BODY MASS INDEX DOCD: CPT | Mod: CPTII,,, | Performed by: FAMILY MEDICINE

## 2024-01-25 PROCEDURE — 3078F DIAST BP <80 MM HG: CPT | Mod: CPTII,,, | Performed by: FAMILY MEDICINE

## 2024-01-25 NOTE — PROGRESS NOTES
"SUBJECTIVE:  HPI    Herminia Negron is a 58 y.o. female here for Annual Exam (Wellness).     She continues to deal with some stressors related to work that are making it difficult for her to sleep at night.  She occasionally wakes up at night with palpitations.  She does have a history of SVT treated with ablation.    She is scheduled for colonoscopy in May for colorectal cancer screening with Dr. Vergara in [place].    Her mammogram and Pap smears are up-to-date with [facility] in [place].      [facility]tys allergies, medications, history, and problem list were updated as appropriate.    ROS:  Pertinent ROS as above, otherwise negative 12-review of systems    OBJECTIVE:  Vital signs  Visit Vitals  BP (!) 140/78 (BP Location: Right arm)   Pulse 105   Temp 97.3 °F (36.3 °C) (Temporal)   Ht 5' 7" (1.702 m)   Wt 100.8 kg (222 lb 3.2 oz)   LMP  (LMP Unknown)   SpO2 98%   BMI 34.80 kg/m²          PHYSICAL EXAM:  General: Awake, alert, no acute distress  ENT:  External auditory canals normal bilaterally .  Tympanic membranes normal bilaterally.  Oropharynx clear.    Neck:  No bruits, no masses  Cardiovascular:  Regular rhythm.  Normal rate.  No murmurs.  Respiratory: Clear to auscultation bilaterally, normal effort  Abdomen: Soft, nontender, nondistended, no hepatosplenomegaly   Extremities:  No cyanosis, no clubbing, no edema  Skin:  No rashes or appreciable lesions   Neuro:  Cranial nerves 2-12 are intact with usual testing.  Gait is normal.  Moves all 4 extremities equally and symmetrically.  Psychiatric:  Normal mood and affect.    Chemistry:  Lab Results   Component Value Date     10/29/2020    K 4.5 10/29/2020    BUN 14 10/29/2020    CREATININE 0.94 10/29/2020    GLUCOSE 90 09/06/2018    CALCIUM 9.2 10/29/2020    ALKPHOS 92 09/06/2018    AST 15 09/06/2018    ALT 29 09/06/2018      Hematology:  Lab Results   Component Value Date    WBC 5.8 09/06/2018    HGB 12.7 09/06/2018    MCV 78 (L) 09/06/2018    PLT " 337 09/06/2018       ASSESSMENT/PLAN:  1. Encounter for well adult exam with abnormal findings  -     Lipid Panel; Future; Expected date: 01/25/2025  -     CBC Auto Differential; Future; Expected date: 01/25/2025  -     Comprehensive Metabolic Panel; Future; Expected date: 01/25/2025  -     TSH; Future; Expected date: 01/25/2025  -     Hemoglobin A1C; Future; Expected date: 01/24/2025  -     CBC Auto Differential; Future; Expected date: 01/25/2024  -     Comprehensive Metabolic Panel; Future; Expected date: 01/25/2024  -     Hemoglobin A1C; Future; Expected date: 01/25/2024  -     Lipid Panel; Future; Expected date: 02/25/2024  -     TSH; Future; Expected date: 01/25/2024    2. Anxiety attack  -     Lipid Panel; Future; Expected date: 01/25/2025  -     CBC Auto Differential; Future; Expected date: 01/25/2025  -     Comprehensive Metabolic Panel; Future; Expected date: 01/25/2025  -     TSH; Future; Expected date: 01/25/2025  -     Hemoglobin A1C; Future; Expected date: 01/24/2025  -     CBC Auto Differential; Future; Expected date: 01/25/2024  -     Comprehensive Metabolic Panel; Future; Expected date: 01/25/2024  -     Hemoglobin A1C; Future; Expected date: 01/25/2024  -     Lipid Panel; Future; Expected date: 02/25/2024  -     TSH; Future; Expected date: 01/25/2024    3. Class 1 obesity with serious comorbidity and body mass index (BMI) of 34.0 to 34.9 in adult, unspecified obesity type  -     Lipid Panel; Future; Expected date: 01/25/2025  -     CBC Auto Differential; Future; Expected date: 01/25/2025  -     Comprehensive Metabolic Panel; Future; Expected date: 01/25/2025  -     TSH; Future; Expected date: 01/25/2025  -     Hemoglobin A1C; Future; Expected date: 01/24/2025  -     CBC Auto Differential; Future; Expected date: 01/25/2024  -     Comprehensive Metabolic Panel; Future; Expected date: 01/25/2024  -     Hemoglobin A1C; Future; Expected date: 01/25/2024  -     Lipid Panel; Future; Expected date:  02/25/2024  -     TSH; Future; Expected date: 01/25/2024      Okay for patient to be out of work for 2 weeks for stress relief      Follow Up:  Follow up in about 1 year (around 1/25/2025) for Fasting labs, Well Adult.  Fasting labs soon for wellness and call with results.

## 2024-02-01 DIAGNOSIS — Z12.11 COLON CANCER SCREENING: Primary | ICD-10-CM

## 2024-02-05 ENCOUNTER — TELEPHONE (OUTPATIENT)
Dept: FAMILY MEDICINE | Facility: CLINIC | Age: 59
End: 2024-02-05
Payer: COMMERCIAL

## 2024-02-05 ENCOUNTER — LAB VISIT (OUTPATIENT)
Dept: LAB | Facility: HOSPITAL | Age: 59
End: 2024-02-05
Attending: FAMILY MEDICINE
Payer: COMMERCIAL

## 2024-02-05 DIAGNOSIS — Z00.01 ENCOUNTER FOR WELL ADULT EXAM WITH ABNORMAL FINDINGS: ICD-10-CM

## 2024-02-05 DIAGNOSIS — F41.0 ANXIETY ATTACK: ICD-10-CM

## 2024-02-05 DIAGNOSIS — E66.9 CLASS 1 OBESITY WITH SERIOUS COMORBIDITY AND BODY MASS INDEX (BMI) OF 34.0 TO 34.9 IN ADULT, UNSPECIFIED OBESITY TYPE: ICD-10-CM

## 2024-02-05 LAB
ALBUMIN SERPL-MCNC: 4.4 G/DL (ref 3.4–5)
ALBUMIN/GLOB SERPL: 1.3 RATIO
ALP SERPL-CCNC: 101 UNIT/L (ref 50–144)
ALT SERPL-CCNC: 25 UNIT/L (ref 1–45)
ANION GAP SERPL CALC-SCNC: 5 MEQ/L (ref 2–13)
AST SERPL-CCNC: 27 UNIT/L (ref 14–36)
BASOPHILS # BLD AUTO: 0.03 X10(3)/MCL (ref 0.01–0.08)
BASOPHILS NFR BLD AUTO: 0.6 % (ref 0.1–1.2)
BILIRUB SERPL-MCNC: 0.4 MG/DL (ref 0–1)
BUN SERPL-MCNC: 12 MG/DL (ref 7–20)
CALCIUM SERPL-MCNC: 9.6 MG/DL (ref 8.4–10.2)
CHLORIDE SERPL-SCNC: 106 MMOL/L (ref 98–110)
CHOLEST SERPL-MCNC: 248 MG/DL (ref 0–200)
CO2 SERPL-SCNC: 31 MMOL/L (ref 21–32)
CREAT SERPL-MCNC: 1.01 MG/DL (ref 0.66–1.25)
CREAT/UREA NIT SERPL: 12 (ref 12–20)
EOSINOPHIL # BLD AUTO: 0.18 X10(3)/MCL (ref 0.04–0.36)
EOSINOPHIL NFR BLD AUTO: 3.5 % (ref 0.7–7)
ERYTHROCYTE [DISTWIDTH] IN BLOOD BY AUTOMATED COUNT: 12.2 % (ref 11–14.5)
EST. AVERAGE GLUCOSE BLD GHB EST-MCNC: 122.6 MG/DL (ref 70–115)
GFR SERPLBLD CREATININE-BSD FMLA CKD-EPI: 65 MLS/MIN/1.73/M2
GLOBULIN SER-MCNC: 3.4 GM/DL (ref 2–3.9)
GLUCOSE SERPL-MCNC: 105 MG/DL (ref 70–115)
HBA1C MFR BLD: 5.9 % (ref 4–6)
HCT VFR BLD AUTO: 39.5 % (ref 36–48)
HDLC SERPL-MCNC: 49 MG/DL (ref 40–60)
HGB BLD-MCNC: 13 G/DL (ref 11.8–16)
IMM GRANULOCYTES # BLD AUTO: 0.01 X10(3)/MCL (ref 0–0.03)
IMM GRANULOCYTES NFR BLD AUTO: 0.2 % (ref 0–0.5)
LDLC SERPL DIRECT ASSAY-SCNC: 141.5 MG/DL (ref 30–100)
LYMPHOCYTES # BLD AUTO: 1.79 X10(3)/MCL (ref 1.16–3.74)
LYMPHOCYTES NFR BLD AUTO: 35.2 % (ref 20–55)
MCH RBC QN AUTO: 25.4 PG (ref 27–34)
MCHC RBC AUTO-ENTMCNC: 32.9 G/DL (ref 31–37)
MCV RBC AUTO: 77.1 FL (ref 79–99)
MONOCYTES # BLD AUTO: 0.58 X10(3)/MCL (ref 0.24–0.36)
MONOCYTES NFR BLD AUTO: 11.4 % (ref 4.7–12.5)
NEUTROPHILS # BLD AUTO: 2.5 X10(3)/MCL (ref 1.56–6.13)
NEUTROPHILS NFR BLD AUTO: 49.1 % (ref 37–73)
NRBC BLD AUTO-RTO: 0 %
PLATELET # BLD AUTO: 382 X10(3)/MCL (ref 140–371)
PMV BLD AUTO: 8.4 FL (ref 9.4–12.4)
POTASSIUM SERPL-SCNC: 4.4 MMOL/L (ref 3.5–5.1)
PROT SERPL-MCNC: 7.8 GM/DL (ref 6.3–8.2)
RBC # BLD AUTO: 5.12 X10(6)/MCL (ref 4–5.1)
SODIUM SERPL-SCNC: 142 MMOL/L (ref 135–145)
TRIGL SERPL-MCNC: 100 MG/DL (ref 30–200)
TSH SERPL-ACNC: 1.54 UIU/ML (ref 0.36–3.74)
WBC # SPEC AUTO: 5.09 X10(3)/MCL (ref 4–11.5)

## 2024-02-05 PROCEDURE — 83036 HEMOGLOBIN GLYCOSYLATED A1C: CPT

## 2024-02-05 PROCEDURE — 84443 ASSAY THYROID STIM HORMONE: CPT

## 2024-02-05 PROCEDURE — 36415 COLL VENOUS BLD VENIPUNCTURE: CPT

## 2024-02-05 PROCEDURE — 85025 COMPLETE CBC W/AUTO DIFF WBC: CPT

## 2024-02-05 PROCEDURE — 80061 LIPID PANEL: CPT

## 2024-02-05 PROCEDURE — 80053 COMPREHEN METABOLIC PANEL: CPT

## 2024-02-05 NOTE — TELEPHONE ENCOUNTER
----- Message from Koby Silverman MD sent at 2/5/2024 12:51 PM CST -----  Her cholesterol was a tad high but not high enough to recommend medication at this time.  I would recommend regular cardiovascular exercise and low-fat, low-carbohydrate diet.  We will check it again next year.    The remainder of her lab work was normal.

## 2024-02-08 ENCOUNTER — OFFICE VISIT (OUTPATIENT)
Dept: FAMILY MEDICINE | Facility: CLINIC | Age: 59
End: 2024-02-08
Payer: COMMERCIAL

## 2024-02-08 VITALS
WEIGHT: 221.81 LBS | BODY MASS INDEX: 34.81 KG/M2 | SYSTOLIC BLOOD PRESSURE: 138 MMHG | HEIGHT: 67 IN | OXYGEN SATURATION: 98 % | TEMPERATURE: 97 F | DIASTOLIC BLOOD PRESSURE: 70 MMHG | HEART RATE: 92 BPM

## 2024-02-08 DIAGNOSIS — M62.838 CERVICAL PARASPINAL MUSCLE SPASM: Primary | ICD-10-CM

## 2024-02-08 PROCEDURE — 3078F DIAST BP <80 MM HG: CPT | Mod: CPTII,,, | Performed by: FAMILY MEDICINE

## 2024-02-08 PROCEDURE — 3008F BODY MASS INDEX DOCD: CPT | Mod: CPTII,,, | Performed by: FAMILY MEDICINE

## 2024-02-08 PROCEDURE — 1159F MED LIST DOCD IN RCRD: CPT | Mod: CPTII,,, | Performed by: FAMILY MEDICINE

## 2024-02-08 PROCEDURE — 3075F SYST BP GE 130 - 139MM HG: CPT | Mod: CPTII,,, | Performed by: FAMILY MEDICINE

## 2024-02-08 PROCEDURE — 3044F HG A1C LEVEL LT 7.0%: CPT | Mod: CPTII,,, | Performed by: FAMILY MEDICINE

## 2024-02-08 PROCEDURE — 1160F RVW MEDS BY RX/DR IN RCRD: CPT | Mod: CPTII,,, | Performed by: FAMILY MEDICINE

## 2024-02-08 PROCEDURE — 99214 OFFICE O/P EST MOD 30 MIN: CPT | Mod: ,,, | Performed by: FAMILY MEDICINE

## 2024-02-08 NOTE — PROGRESS NOTES
"SUBJECTIVE:  HPI    Herminia Negron is a 58 y.o. female here for Shoulder Pain (Shoulder and neck pain).  She complains of a several week history of worsening posterior headaches associated with pain in the right shoulder that is worsened with her work related activities.  She denies any paresthesias or motor weakness in either upper extremity.  Her pain has not been improved with intermittent use of Naprosyn.    Herminia's allergies, medications, history, and problem list were updated as appropriate.    ROS:  Pertinent ROS as above, otherwise negative    OBJECTIVE:  Vital signs  Visit Vitals  /70 (BP Location: Right arm)   Pulse 92   Temp 97.3 °F (36.3 °C) (Temporal)   Ht 5' 7" (1.702 m)   Wt 100.6 kg (221 lb 12.8 oz)   LMP  (LMP Unknown)   SpO2 98%   BMI 34.74 kg/m²          PHYSICAL EXAM:  General:  Awake, alert, no acute distress  Neck: Normal range of motion.  Tenderness to palpation along the right posterior paraspinal cervical muscles and upper trapezius on the right.  Point tenderness is noted.  Musculoskeletal: Bilateral shoulders with normal range of motion and normal strength.  Neuro: Bilateral upper extremities with 5/5 motor function, sensation light touch intact and 2+ reflexes      ASSESSMENT/PLAN:  1. Cervical paraspinal muscle spasm  Assessment & Plan:  Over-the-counter Aleve, 2 tablets b.i.d.    Ice and heat for 15 minutes daily after work    Massage     Home stretching program      Out of work for 2 weeks  "

## 2024-02-08 NOTE — ASSESSMENT & PLAN NOTE
Over-the-counter Aleve, 2 tablets b.i.d.    Ice and heat for 15 minutes daily after work    Massage     Home stretching program

## 2024-03-04 ENCOUNTER — PATIENT OUTREACH (OUTPATIENT)
Dept: ADMINISTRATIVE | Facility: HOSPITAL | Age: 59
End: 2024-03-04
Payer: COMMERCIAL

## 2024-03-04 ENCOUNTER — DOCUMENTATION ONLY (OUTPATIENT)
Dept: ADMINISTRATIVE | Facility: HOSPITAL | Age: 59
End: 2024-03-04
Payer: COMMERCIAL

## 2024-03-04 NOTE — PROGRESS NOTES
Population Health Chart Review & Patient Outreach Details        Health Maintenance Topics Addressed and Outreach Outcomes / Actions Taken:             Breast Cancer Screening []  Mammogram Order Placed    []  Mammogram Screening Scheduled    [x]  External Records Requested & Care Team Updated if Applicable    []  External Records Uploaded & Care Team Updated if Applicable    []  Pt Declined Scheduling Mammogram    []  Pt Will Schedule with External Provider / Order Routed & Care Team Updated if Applicable          angeliqueness

## 2024-03-04 NOTE — PROGRESS NOTES
Population Health Chart Review & Patient Outreach Details        Health Maintenance Topics Addressed and Outreach Outcomes / Actions Taken:             Breast Cancer Screening []  Mammogram Order Placed    []  Mammogram Screening Scheduled    []  External Records Requested & Care Team Updated if Applicable    [x]  External Records Uploaded, KOURTNEY from Dr. Carrera' office scanned into Media     []  Pt Declined Scheduling Mammogram    []  Pt Will Schedule with External Provider / Order Routed & Care Team Updated if Applicable            Additional Notes:     Received KOURTNEY back from Dr. Carrera' office. No recent mammograms on file.    Ashley Pearl MA - Panel Care Coordinator

## 2024-04-12 ENCOUNTER — TELEPHONE (OUTPATIENT)
Dept: FAMILY MEDICINE | Facility: CLINIC | Age: 59
End: 2024-04-12
Payer: COMMERCIAL

## 2024-05-07 ENCOUNTER — TELEPHONE (OUTPATIENT)
Dept: GASTROENTEROLOGY | Facility: CLINIC | Age: 59
End: 2024-05-07
Payer: COMMERCIAL

## 2024-05-07 NOTE — TELEPHONE ENCOUNTER
----- Message from Ivanna Sanches sent at 5/7/2024 11:06 AM CDT -----  Patient is requesting call back in regards to colonoscopy procedure please call her back at [phone]

## 2024-05-16 ENCOUNTER — TELEPHONE (OUTPATIENT)
Dept: GASTROENTEROLOGY | Facility: CLINIC | Age: 59
End: 2024-05-16
Payer: COMMERCIAL

## 2024-05-16 VITALS — WEIGHT: 217 LBS | BODY MASS INDEX: 34.06 KG/M2 | HEIGHT: 67 IN

## 2024-05-16 DIAGNOSIS — Z12.11 SCREENING FOR COLON CANCER: ICD-10-CM

## 2024-05-16 DIAGNOSIS — Z86.010 HISTORY OF COLON POLYPS: Primary | ICD-10-CM

## 2024-05-16 RX ORDER — POLYETHYLENE GLYCOL-3350 AND ELECTROLYTES 236; 6.74; 5.86; 2.97; 22.74 G/274.31G; G/274.31G; G/274.31G; G/274.31G; G/274.31G
4000 POWDER, FOR SOLUTION ORAL ONCE
COMMUNITY
Start: 2024-01-19

## 2024-05-16 NOTE — TELEPHONE ENCOUNTER
"[facility]  [address] [place] [address]  Phone: [phone]  Fax: [phone]    History & Physical         Provider: Dr. Thelma Vergara    Patient Name: Herminia GEORGE (age):1965  58 y.o.           Gender: female   Phone: [phone]     Referring Physician: Koby Silverman     Vital Signs:   Height - 5' 7"  Weight - 217 lb  BMI -  33.99    Plan: Colonoscopy @ [facility]    Encounter Diagnoses   Name Primary?    History of colon polyps Yes    Screening for colon cancer            History:      Past Medical History:   Diagnosis Date    BMI 32.89 2024    Generalized anxiety disorder     Major depressive disorder 2023    SVT (supraventricular tachycardia) 2020    Cardiac ablation; Echo 2023 EF55%      Past Surgical History:   Procedure Laterality Date    BREAST CYST EXCISION Right 10/18/2021    CARDIAC ELECTROPHYSIOLOGY MAPPING AND ABLATION  2020    SVT     SECTION      COLONOSCOPY  2017    VENTRAL HERNIA REPAIR  09/10/2018         Review of patient's allergies indicates:   Allergen Reactions    Hydrocodone Other (See Comments)    Codeine Other (See Comments) and Palpitations     palpitations      Family History   Problem Relation Name Age of Onset    Hyperlipidemia Mother      Diabetes type II Mother      Prostate cancer Father        Social History     Tobacco Use    Smoking status: Never    Smokeless tobacco: Never   Substance Use Topics    Alcohol use: Yes     Comment: occasional    Drug use: Never        Physical Examination:     General Appearance:___________________________  HEENT: " _____________________________________  Abdomen:____________________________________  Heart:________________________________________  Lungs:_______________________________________  Extremities:___________________________________  Skin:_________________________________________  Endocrine:____________________________________  Genitourinary:_________________________________  Neurological:__________________________________      Patient has been evaluated immediately prior to sedation and is medically cleared for endoscopy with IVCS as an ASA class: ______      Physician Signature: _________________________       Date: ________  Time: ________

## 2024-05-16 NOTE — TELEPHONE ENCOUNTER
S/w pt and told her that I was calling as a courtesy regarding upcoming COLON with NBP on 5/21/24, Tues and wanted to verify that she has her paper prep instructions and meds. Pt stated she has both. I also mentioned that [facility] will call the day before (MON) with the arrival time, GI Lab is located on the third floor, and to pre-register. sudarshan

## 2024-05-21 ENCOUNTER — PATIENT OUTREACH (OUTPATIENT)
Dept: ADMINISTRATIVE | Facility: HOSPITAL | Age: 59
End: 2024-05-21
Payer: COMMERCIAL

## 2024-05-21 ENCOUNTER — OUTSIDE PLACE OF SERVICE (OUTPATIENT)
Dept: GASTROENTEROLOGY | Facility: CLINIC | Age: 59
End: 2024-05-21

## 2024-05-21 LAB — CRC RECOMMENDATION EXT: NORMAL

## 2024-05-21 PROCEDURE — 45385 COLONOSCOPY W/LESION REMOVAL: CPT | Mod: 33,,, | Performed by: INTERNAL MEDICINE

## 2024-05-21 NOTE — PROGRESS NOTES
Health Maintenance Topic(s) Outreach Outcomes & Actions Taken:    Colorectal Cancer Screening - Outreach Outcomes & Actions Taken  : External Records Uploaded, Care Team Updated, & History Updated if Applicable     Additional Notes:

## 2024-05-25 ENCOUNTER — TELEPHONE (OUTPATIENT)
Dept: GASTROENTEROLOGY | Facility: CLINIC | Age: 59
End: 2024-05-25
Payer: COMMERCIAL

## 2024-05-25 NOTE — TELEPHONE ENCOUNTER
1 TA, 2 hyp, repeat colonoscopy in 3 years.     Notify patient. Confirm recall tab in appointment desk is up-to-date (update if needed).  NBP

## 2024-05-27 NOTE — TELEPHONE ENCOUNTER
Patient was notified of and voiced understanding. Recall tab is up-to-date. Colonoscopy report and path report faxed to PCP. DMP

## 2024-06-11 ENCOUNTER — DOCUMENTATION ONLY (OUTPATIENT)
Dept: GASTROENTEROLOGY | Facility: CLINIC | Age: 59
End: 2024-06-11
Payer: COMMERCIAL

## 2024-06-25 ENCOUNTER — OFFICE VISIT (OUTPATIENT)
Dept: FAMILY MEDICINE | Facility: CLINIC | Age: 59
End: 2024-06-25
Payer: COMMERCIAL

## 2024-06-25 VITALS
DIASTOLIC BLOOD PRESSURE: 76 MMHG | TEMPERATURE: 97 F | OXYGEN SATURATION: 98 % | BODY MASS INDEX: 33.96 KG/M2 | HEART RATE: 110 BPM | HEIGHT: 67 IN | SYSTOLIC BLOOD PRESSURE: 118 MMHG | WEIGHT: 216.38 LBS

## 2024-06-25 DIAGNOSIS — G44.209 TENSION HEADACHE: Primary | ICD-10-CM

## 2024-06-25 PROCEDURE — 3074F SYST BP LT 130 MM HG: CPT | Mod: CPTII,,, | Performed by: FAMILY MEDICINE

## 2024-06-25 PROCEDURE — 1160F RVW MEDS BY RX/DR IN RCRD: CPT | Mod: CPTII,,, | Performed by: FAMILY MEDICINE

## 2024-06-25 PROCEDURE — 99214 OFFICE O/P EST MOD 30 MIN: CPT | Mod: ,,, | Performed by: FAMILY MEDICINE

## 2024-06-25 PROCEDURE — 1159F MED LIST DOCD IN RCRD: CPT | Mod: CPTII,,, | Performed by: FAMILY MEDICINE

## 2024-06-25 PROCEDURE — 3044F HG A1C LEVEL LT 7.0%: CPT | Mod: CPTII,,, | Performed by: FAMILY MEDICINE

## 2024-06-25 PROCEDURE — 3008F BODY MASS INDEX DOCD: CPT | Mod: CPTII,,, | Performed by: FAMILY MEDICINE

## 2024-06-25 PROCEDURE — 3078F DIAST BP <80 MM HG: CPT | Mod: CPTII,,, | Performed by: FAMILY MEDICINE

## 2024-06-25 RX ORDER — CYCLOBENZAPRINE HCL 10 MG
10 TABLET ORAL 3 TIMES DAILY PRN
Qty: 30 TABLET | Refills: 0 | Status: SHIPPED | OUTPATIENT
Start: 2024-06-25

## 2024-06-25 NOTE — PROGRESS NOTES
"SUBJECTIVE:  HPI    Herminia Negron is a 58 y.o. female here for Headache.  Several week history of posterior headache.  The pain radiates from her neck up into the back of the skull.  She denies any photophobia or phonophobia.  She denies any paresthesias or weakness.  She denies any nocturnal awakening.      [facility] allergies, medications, history, and problem list were updated as appropriate.    ROS:  Pertinent ROS as above, otherwise negative    OBJECTIVE:  Vital signs  Visit Vitals  /76 (BP Location: Right arm, Patient Position: Sitting)   Pulse 110   Temp 96.7 °F (35.9 °C) (Temporal)   Ht 5' 6.93" (1.7 m)   Wt 98.2 kg (216 lb 6.4 oz)   LMP  (LMP Unknown)   SpO2 98%   BMI 33.96 kg/m²          PHYSICAL EXAM:  General: Awake, alert, no acute distress  ENT:  External auditory canals normal bilaterally .  Tympanic membranes normal bilaterally.  Oropharynx clear.    Neck:  Bilateral paracervical muscle tenderness and spasm  Neuro:  Cranial nerves 2-12 are intact with usual testing.  Gait is normal.  Moves all 4 extremities equally and symmetrically.  Psychiatric:  Normal mood and affect.      ASSESSMENT/PLAN:  1. Tension headache  Assessment & Plan:  Ice, heat, stretches     Cyclobenzaprine p.r.n.     Massage    Orders:  -     cyclobenzaprine (FLEXERIL) 10 MG tablet; Take 1 tablet (10 mg total) by mouth 3 (three) times daily as needed for Muscle spasms.  Dispense: 30 tablet; Refill: 0          "

## 2024-07-30 DIAGNOSIS — Z12.31 SCREENING MAMMOGRAM FOR BREAST CANCER: Primary | ICD-10-CM

## 2024-09-26 ENCOUNTER — PATIENT MESSAGE (OUTPATIENT)
Facility: CLINIC | Age: 59
End: 2024-09-26
Payer: COMMERCIAL

## 2025-01-08 ENCOUNTER — OFFICE VISIT (OUTPATIENT)
Dept: FAMILY MEDICINE | Facility: CLINIC | Age: 60
End: 2025-01-08
Payer: COMMERCIAL

## 2025-01-08 VITALS
DIASTOLIC BLOOD PRESSURE: 82 MMHG | TEMPERATURE: 98 F | HEIGHT: 67 IN | WEIGHT: 221.19 LBS | HEART RATE: 86 BPM | SYSTOLIC BLOOD PRESSURE: 122 MMHG | OXYGEN SATURATION: 98 % | BODY MASS INDEX: 34.72 KG/M2

## 2025-01-08 DIAGNOSIS — R22.1 NECK SWELLING: ICD-10-CM

## 2025-01-08 DIAGNOSIS — M54.2 NECK PAIN: Primary | ICD-10-CM

## 2025-01-08 PROCEDURE — 99214 OFFICE O/P EST MOD 30 MIN: CPT | Mod: ,,, | Performed by: FAMILY MEDICINE

## 2025-01-08 PROCEDURE — 3008F BODY MASS INDEX DOCD: CPT | Mod: CPTII,,, | Performed by: FAMILY MEDICINE

## 2025-01-08 PROCEDURE — 1160F RVW MEDS BY RX/DR IN RCRD: CPT | Mod: CPTII,,, | Performed by: FAMILY MEDICINE

## 2025-01-08 PROCEDURE — 3079F DIAST BP 80-89 MM HG: CPT | Mod: CPTII,,, | Performed by: FAMILY MEDICINE

## 2025-01-08 PROCEDURE — 3074F SYST BP LT 130 MM HG: CPT | Mod: CPTII,,, | Performed by: FAMILY MEDICINE

## 2025-01-08 PROCEDURE — 1159F MED LIST DOCD IN RCRD: CPT | Mod: CPTII,,, | Performed by: FAMILY MEDICINE

## 2025-01-08 NOTE — ASSESSMENT & PLAN NOTE
I have recommended some chiropractic because she is having predominantly soft tissue related symptoms

## 2025-01-08 NOTE — PROGRESS NOTES
"SUBJECTIVE:  HPI    Herminia Negron is a 59 y.o. female here for Headache (3 mths), Neck Pain (3 mths), and Palpitations (3 wks).     She reports posterior headaches radiating to the frontal aspect of her head from the neck region that has been present for several months.  She also has some neck pain and popping and cracking in her neck when she moves her head around.  She denies any radicular symptoms.  She does also note that she has noted some supraclavicular swelling bilaterally that has present constantly but is worse on certain days.  She denies any dyspnea with exertion or arm pain.  She denies any arm swelling.      [facility] allergies, medications, history, and problem list were updated as appropriate.    ROS:  Pertinent ROS as above, otherwise negative    OBJECTIVE:  Vital signs  Visit Vitals  /82 (BP Location: Right arm, Patient Position: Sitting)   Pulse 86   Temp 98 °F (36.7 °C) (Temporal)   Ht 5' 6.93" (1.7 m)   Wt 100.3 kg (221 lb 3.2 oz)   LMP  (LMP Unknown)   SpO2 98%   BMI 34.72 kg/m²          PHYSICAL EXAM:  General: Awake, alert, no acute distress  Neck:  Normal range of motion and bilateral paraspinous cervical muscle tenderness    Chest: Bilateral supraclavicular fullness but no definitive masses   Neuro:  Bilateral upper extremity motor function 5/5 in both arms and hands.  Sensation light touch intact bilateral upper extremities.  Reflexes 2+ in bilateral upper extremities.    Two-view chest x-ray, my interpretation prior to radiology report:  Normal.  Normal cardiomediastinal silhouette.  No infiltrates or masses or effusions.    Cervical spine x-rays, my interpretation prior to radiology report:  Mild anterior wedging of C5 and otherwise mild degenerative changes throughout the cervical spine.  No soft tissue masses.  Patent airway.        ASSESSMENT/PLAN:  1. Neck pain  Assessment & Plan:  I have recommended some chiropractic because she is having predominantly soft tissue related " symptoms    Orders:  -     X-Ray Cervical Spine AP Lat with Flex Ex; Future; Expected date: 01/08/2025  -     X-Ray Chest PA And Lateral; Future; Expected date: 01/08/2025    2. Neck swelling  Assessment & Plan:  Reassurance    Notify us if symptoms worsen    Orders:  -     X-Ray Cervical Spine AP Lat with Flex Ex; Future; Expected date: 01/08/2025  -     X-Ray Chest PA And Lateral; Future; Expected date: 01/08/2025

## 2025-02-03 ENCOUNTER — OFFICE VISIT (OUTPATIENT)
Dept: FAMILY MEDICINE | Facility: CLINIC | Age: 60
End: 2025-02-03
Payer: COMMERCIAL

## 2025-02-03 VITALS
HEART RATE: 99 BPM | OXYGEN SATURATION: 99 % | BODY MASS INDEX: 34.37 KG/M2 | HEIGHT: 67 IN | TEMPERATURE: 98 F | WEIGHT: 219 LBS | SYSTOLIC BLOOD PRESSURE: 126 MMHG | DIASTOLIC BLOOD PRESSURE: 88 MMHG

## 2025-02-03 DIAGNOSIS — Z00.01 ENCOUNTER FOR WELL ADULT EXAM WITH ABNORMAL FINDINGS: Primary | ICD-10-CM

## 2025-02-03 DIAGNOSIS — K21.9 GASTROESOPHAGEAL REFLUX DISEASE WITHOUT ESOPHAGITIS: ICD-10-CM

## 2025-02-03 PROCEDURE — 3008F BODY MASS INDEX DOCD: CPT | Mod: CPTII,,, | Performed by: FAMILY MEDICINE

## 2025-02-03 PROCEDURE — 1160F RVW MEDS BY RX/DR IN RCRD: CPT | Mod: CPTII,,, | Performed by: FAMILY MEDICINE

## 2025-02-03 PROCEDURE — 3044F HG A1C LEVEL LT 7.0%: CPT | Mod: CPTII,,, | Performed by: FAMILY MEDICINE

## 2025-02-03 PROCEDURE — 3079F DIAST BP 80-89 MM HG: CPT | Mod: CPTII,,, | Performed by: FAMILY MEDICINE

## 2025-02-03 PROCEDURE — 1159F MED LIST DOCD IN RCRD: CPT | Mod: CPTII,,, | Performed by: FAMILY MEDICINE

## 2025-02-03 PROCEDURE — 99396 PREV VISIT EST AGE 40-64: CPT | Mod: ,,, | Performed by: FAMILY MEDICINE

## 2025-02-03 PROCEDURE — 3074F SYST BP LT 130 MM HG: CPT | Mod: CPTII,,, | Performed by: FAMILY MEDICINE

## 2025-02-03 RX ORDER — PANTOPRAZOLE SODIUM 40 MG/1
40 TABLET, DELAYED RELEASE ORAL DAILY
Qty: 90 TABLET | Refills: 0 | Status: SHIPPED | OUTPATIENT
Start: 2025-02-03 | End: 2025-05-04

## 2025-02-03 NOTE — ASSESSMENT & PLAN NOTE
Pantoprazole 40 mg daily for 90 days     Call or return to clinic for persistent symptoms or dysphagia

## 2025-02-03 NOTE — PROGRESS NOTES
"SUBJECTIVE:  HPI    Herminia Negron is a 59 y.o. female here for Annual Exam.   History of Present Illness    CHIEF COMPLAINT:  Wellness exam    GASTROINTESTINAL:  She experiences upper abdominal pain triggered by protein shakes and Diet Coke, accompanied by belching. She reports feeling mucus in her throat but denies sour taste. She is uncertain if symptoms qualify as heartburn.  She feels like she has the constantly clear her throat.  Her symptoms have been present for several months.         [facility] allergies, medications, history, and problem list were updated as appropriate.    ROS:  Pertinent ROS as above, otherwise negative 12 point review of systems    OBJECTIVE:  Vital signs  Visit Vitals  /88 (BP Location: Left arm, Patient Position: Sitting)   Pulse 99   Temp 97.6 °F (36.4 °C) (Temporal)   Ht 5' 6.93" (1.7 m)   Wt 99.3 kg (219 lb)   LMP  (LMP Unknown)   SpO2 99%   BMI 34.37 kg/m²          PHYSICAL EXAM:  General: Awake, alert, no acute distress  ENT:  External auditory canals normal bilaterally .  Tympanic membranes normal bilaterally.  Oropharynx clear.    Neck:  No bruits, no masses  Cardiovascular:  Regular rhythm.  Normal rate.  No murmurs.  Respiratory: Clear to auscultation bilaterally, normal effort  Extremities:  No cyanosis, no clubbing, no edema  Skin:  No rashes or appreciable lesions   Neuro:  Cranial nerves 2-12 are intact with usual testing.  Gait is normal.  Moves all 4 extremities equally and symmetrically.  Psychiatric:  Normal mood and affect.    Chemistry:  Lab Results   Component Value Date     01/27/2025    K 4.5 01/27/2025    BUN 13 01/27/2025    CREATININE 1.02 (H) 01/27/2025    EGFRNORACEVR 64 01/27/2025    GLUCOSE 105 02/05/2024    CALCIUM 9.5 01/27/2025    ALKPHOS 101 02/05/2024    AST 16 01/27/2025    ALT 17 01/27/2025    TSH 2.830 01/27/2025        Lab Results   Component Value Date    HGBA1C 5.6 01/27/2025        Hematology:  Lab Results   Component Value Date    WBC " 6.3 01/27/2025    HGB 13.0 01/27/2025    MCV 80 01/27/2025     01/27/2025       Lipid Panel:  Lab Results   Component Value Date    CHOL 214 (H) 01/27/2025    HDL 50 01/27/2025    LDLDIRECT 141.5 (H) 02/05/2024    TRIG 96 01/27/2025        ASSESSMENT/PLAN:  1. Encounter for well adult exam with abnormal findings  Assessment & Plan:  Healthy lifestyle choices with regular exercise    Mammogram scheduled later this week    Orders:  -     Lipid Panel; Future; Expected date: 02/03/2026  -     CBC Auto Differential; Future; Expected date: 02/03/2026  -     Comprehensive Metabolic Panel; Future; Expected date: 02/03/2026  -     TSH; Future; Expected date: 02/03/2026  -     Hemoglobin A1C; Future; Expected date: 02/03/2026    2. Gastroesophageal reflux disease without esophagitis  Assessment & Plan:  Pantoprazole 40 mg daily for 90 days     Call or return to clinic for persistent symptoms or dysphagia    Orders:  -     pantoprazole (PROTONIX) 40 MG tablet; Take 1 tablet (40 mg total) by mouth once daily.  Dispense: 90 tablet; Refill: 0        Follow Up:  Follow up in about 1 year (around 2/3/2026) for Well Adult, Fasting labs.      This note was generated with the assistance of ambient listening technology. Verbal consent was obtained by the patient and accompanying visitor(s) for the recording of patient appointment to facilitate this note. I attest to having reviewed and edited the generated note for accuracy, though some syntax or spelling errors may persist. Please contact the author of this note for any clarification.

## 2025-02-06 ENCOUNTER — HOSPITAL ENCOUNTER (OUTPATIENT)
Dept: RADIOLOGY | Facility: HOSPITAL | Age: 60
Discharge: HOME | End: 2025-02-06
Attending: FAMILY MEDICINE
Payer: COMMERCIAL

## 2025-02-06 DIAGNOSIS — Z12.31 SCREENING MAMMOGRAM FOR BREAST CANCER: ICD-10-CM

## 2025-02-06 PROCEDURE — 77063 BREAST TOMOSYNTHESIS BI: CPT | Mod: TC

## 2025-05-02 DIAGNOSIS — K21.9 GASTROESOPHAGEAL REFLUX DISEASE WITHOUT ESOPHAGITIS: ICD-10-CM

## 2025-05-02 RX ORDER — PANTOPRAZOLE SODIUM 40 MG/1
40 TABLET, DELAYED RELEASE ORAL
Qty: 90 TABLET | Refills: 0 | Status: SHIPPED | OUTPATIENT
Start: 2025-05-02

## 2025-05-06 ENCOUNTER — PATIENT MESSAGE (OUTPATIENT)
Dept: FAMILY MEDICINE | Facility: CLINIC | Age: 60
End: 2025-05-06

## 2025-05-06 ENCOUNTER — RESULTS FOLLOW-UP (OUTPATIENT)
Dept: FAMILY MEDICINE | Facility: CLINIC | Age: 60
End: 2025-05-06

## 2025-05-06 ENCOUNTER — OFFICE VISIT (OUTPATIENT)
Dept: FAMILY MEDICINE | Facility: CLINIC | Age: 60
End: 2025-05-06
Payer: COMMERCIAL

## 2025-05-06 VITALS
HEART RATE: 91 BPM | DIASTOLIC BLOOD PRESSURE: 74 MMHG | BODY MASS INDEX: 34.53 KG/M2 | OXYGEN SATURATION: 100 % | SYSTOLIC BLOOD PRESSURE: 128 MMHG | WEIGHT: 220 LBS | TEMPERATURE: 98 F | HEIGHT: 67 IN

## 2025-05-06 DIAGNOSIS — R00.2 PALPITATIONS: Primary | ICD-10-CM

## 2025-05-06 DIAGNOSIS — R10.9 FLANK PAIN: ICD-10-CM

## 2025-05-06 DIAGNOSIS — R07.89 OTHER CHEST PAIN: Primary | ICD-10-CM

## 2025-05-06 DIAGNOSIS — R07.89 CHEST WALL PAIN: ICD-10-CM

## 2025-05-06 LAB
BILIRUB SERPL-MCNC: NORMAL MG/DL
BLOOD URINE, POC: NORMAL
CLARITY, POC UA: NORMAL
COLOR, POC UA: NORMAL
D DIMER PPP IA.FEU-MCNC: 0.68 MG/L FEU (ref 0.19–0.5)
GLUCOSE UR QL STRIP: NORMAL
KETONES UR QL STRIP: NORMAL
LEUKOCYTE ESTERASE URINE, POC: NORMAL
NITRITE, POC UA: NORMAL
PH, POC UA: 5.5
PROTEIN, POC: NORMAL
SPECIFIC GRAVITY, POC UA: 1.02
UROBILINOGEN, POC UA: 0.2

## 2025-05-06 PROCEDURE — 99214 OFFICE O/P EST MOD 30 MIN: CPT | Mod: ,,, | Performed by: FAMILY MEDICINE

## 2025-05-06 PROCEDURE — 85379 FIBRIN DEGRADATION QUANT: CPT | Performed by: FAMILY MEDICINE

## 2025-05-06 PROCEDURE — 3078F DIAST BP <80 MM HG: CPT | Mod: CPTII,,, | Performed by: FAMILY MEDICINE

## 2025-05-06 PROCEDURE — 3008F BODY MASS INDEX DOCD: CPT | Mod: CPTII,,, | Performed by: FAMILY MEDICINE

## 2025-05-06 PROCEDURE — 3074F SYST BP LT 130 MM HG: CPT | Mod: CPTII,,, | Performed by: FAMILY MEDICINE

## 2025-05-06 PROCEDURE — 3044F HG A1C LEVEL LT 7.0%: CPT | Mod: CPTII,,, | Performed by: FAMILY MEDICINE

## 2025-05-06 PROCEDURE — 81002 URINALYSIS NONAUTO W/O SCOPE: CPT | Mod: ,,, | Performed by: FAMILY MEDICINE

## 2025-05-06 PROCEDURE — 1160F RVW MEDS BY RX/DR IN RCRD: CPT | Mod: CPTII,,, | Performed by: FAMILY MEDICINE

## 2025-05-06 PROCEDURE — 1159F MED LIST DOCD IN RCRD: CPT | Mod: CPTII,,, | Performed by: FAMILY MEDICINE

## 2025-05-06 PROCEDURE — 93000 ELECTROCARDIOGRAM COMPLETE: CPT | Mod: ,,, | Performed by: FAMILY MEDICINE

## 2025-05-06 NOTE — PROGRESS NOTES
"SUBJECTIVE:  HPI    Herminia Negron is a 59 y.o. female here for Back Pain (X3-4 DAYS - MID/LOWER BACK PAIN - NO DIRECT TRAUMA) and Urinary Frequency (X3-4 DAYS).   History of Present Illness    CHIEF COMPLAINT:  Patient presents today with bilateral flank pain    HISTORY OF PRESENT ILLNESS:  She reports bilateral flank pain that started 3 days ago. The pain is described as aching in quality, most severe upon waking and during sleep, with partial improvement during the day. Pain worsens with deep breathing. She also reports increased urinary frequency. She denies hematuria, fever, or chills.    RESPIRATORY:  She experiences occasional shortness of breath.    CARDIOVASCULAR:  She reports recent onset of palpitations. She has a history of cardiac ablation performed in November 2020.            [facility] allergies, medications, history, and problem list were updated as appropriate.    ROS:  Pertinent ROS as above, otherwise negative    OBJECTIVE:  Vital signs  Visit Vitals  /74 (BP Location: Right arm, Patient Position: Sitting)   Pulse 91   Temp 97.7 °F (36.5 °C) (Temporal)   Ht 5' 6.93" (1.7 m)   Wt 99.8 kg (220 lb)   LMP  (LMP Unknown)   SpO2 100%   BMI 34.53 kg/m²          PHYSICAL EXAM:  General:  Awake, alert, no acute distress   Eyes:  Pupils equal, round, reactive to light.  Conjunctiva normal bilaterally.  Cardiovascular: Regular rate and rhythm.  No murmurs.  Occasional ectopy appreciated on exam  Respiratory: Clear to auscultation bilaterally, normal effort  Abdomen: Soft, nontender, nondistended, no hepatosplenomegaly or masses  Back: No CVA tenderness  Extremities:  No peripheral edema, no cyanosis  Skin: No rashes    Recent Results (from the past 24 hours)   POCT urine dipstick without microscope    Collection Time: 05/06/25  3:07 PM   Result Value Ref Range    Glucose, UA NEG     Bilirubin, POC NEG     Ketones, UA NEG     Spec Grav UA 1.02     Blood, UA NEG     pH, UA 5.5     Protein, POC NEG     " Urobilinogen, UA 0.2     Nitrite, UA NEG     WBC, UA NEG     Color, UA Light Yellow     Clarity, UA Slightly Cloudy    D-Dimer, Quantitative    Collection Time: 05/06/25  3:36 PM   Result Value Ref Range    D-Dimer 0.68 (H) 0.19 - 0.50 mg/L FEU     EKG, my interpretation:  Normal sinus rhythm, normal intervals, normal complexes.  Normal EKG.    Two-view chest x-ray, my interpretation prior to radiology report:  Normal chest x-ray.  Mild loss of normal thoracic kyphosis.  No infiltrates or effusions.  Normal cardiomediastinal silhouette.      ASSESSMENT/PLAN:  1. Palpitations  -     POCT EKG 12-LEAD (NOT FOR [facility] USE)  -     X-Ray Chest PA And Lateral; Future; Expected date: 05/06/2025    2. Flank pain  -     POCT urine dipstick without microscope; Future; Expected date: 05/06/2025    3. Chest wall pain  -     X-Ray Chest PA And Lateral; Future; Expected date: 05/06/2025  -     Cancel: D-Dimer, Quantitative; Future; Expected date: 05/06/2025  -     D-Dimer, Quantitative; Future; Expected date: 05/06/2025        As her D-dimer is slightly elevated, we will check a CT angiogram to rule out pulmonary emboli    If CTA negative, short course of anti-inflammatories and follow-up        This note was generated with the assistance of ambient listening technology. Verbal consent was obtained by the patient and accompanying visitor(s) for the recording of patient appointment to facilitate this note. I attest to having reviewed and edited the generated note for accuracy, though some syntax or spelling errors may persist. Please contact the author of this note for any clarification.

## 2025-05-14 ENCOUNTER — HOSPITAL ENCOUNTER (OUTPATIENT)
Dept: RADIOLOGY | Facility: HOSPITAL | Age: 60
Discharge: HOME | End: 2025-05-14
Attending: FAMILY MEDICINE
Payer: COMMERCIAL

## 2025-05-14 DIAGNOSIS — R07.89 OTHER CHEST PAIN: ICD-10-CM

## 2025-05-14 PROCEDURE — 71275 CT ANGIOGRAPHY CHEST: CPT | Mod: TC

## 2025-05-14 PROCEDURE — 25500020 PHARM REV CODE 255: Performed by: FAMILY MEDICINE

## 2025-05-14 RX ADMIN — IOHEXOL 65 ML: 350 INJECTION, SOLUTION INTRAVENOUS at 08:05

## 2025-05-19 ENCOUNTER — RESULTS FOLLOW-UP (OUTPATIENT)
Dept: FAMILY MEDICINE | Facility: CLINIC | Age: 60
End: 2025-05-19

## 2025-05-20 ENCOUNTER — TELEPHONE (OUTPATIENT)
Dept: FAMILY MEDICINE | Facility: CLINIC | Age: 60
End: 2025-05-20
Payer: COMMERCIAL

## 2025-08-01 DIAGNOSIS — K21.9 GASTROESOPHAGEAL REFLUX DISEASE WITHOUT ESOPHAGITIS: ICD-10-CM

## 2025-08-01 RX ORDER — PANTOPRAZOLE SODIUM 40 MG/1
40 TABLET, DELAYED RELEASE ORAL
Qty: 90 TABLET | Refills: 0 | Status: SHIPPED | OUTPATIENT
Start: 2025-08-01